# Patient Record
Sex: MALE | Race: WHITE
[De-identification: names, ages, dates, MRNs, and addresses within clinical notes are randomized per-mention and may not be internally consistent; named-entity substitution may affect disease eponyms.]

---

## 2021-07-01 ENCOUNTER — HOSPITAL ENCOUNTER (EMERGENCY)
Dept: HOSPITAL 11 - JP.ED | Age: 86
Discharge: NURSING, REHAB OR LONG-TERM CARE | End: 2021-07-01
Payer: COMMERCIAL

## 2021-07-01 DIAGNOSIS — E78.00: ICD-10-CM

## 2021-07-01 DIAGNOSIS — Z88.1: ICD-10-CM

## 2021-07-01 DIAGNOSIS — Z79.899: ICD-10-CM

## 2021-07-01 DIAGNOSIS — S72.091A: Primary | ICD-10-CM

## 2021-07-01 DIAGNOSIS — Z79.82: ICD-10-CM

## 2021-07-01 DIAGNOSIS — Z88.0: ICD-10-CM
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
## 2021-07-01 DIAGNOSIS — Z87.891: ICD-10-CM

## 2021-07-01 DIAGNOSIS — I12.9: ICD-10-CM

## 2021-07-01 DIAGNOSIS — N18.9: ICD-10-CM

## 2021-07-01 DIAGNOSIS — Z88.8: ICD-10-CM

## 2021-07-01 PROCEDURE — 85027 COMPLETE CBC AUTOMATED: CPT

## 2021-07-01 PROCEDURE — 99285 EMERGENCY DEPT VISIT HI MDM: CPT

## 2021-07-01 PROCEDURE — 86900 BLOOD TYPING SEROLOGIC ABO: CPT

## 2021-07-01 PROCEDURE — 36415 COLL VENOUS BLD VENIPUNCTURE: CPT

## 2021-07-01 PROCEDURE — 96375 TX/PRO/DX INJ NEW DRUG ADDON: CPT

## 2021-07-01 PROCEDURE — 70450 CT HEAD/BRAIN W/O DYE: CPT

## 2021-07-01 PROCEDURE — 96376 TX/PRO/DX INJ SAME DRUG ADON: CPT

## 2021-07-01 PROCEDURE — 73502 X-RAY EXAM HIP UNI 2-3 VIEWS: CPT

## 2021-07-01 PROCEDURE — 72125 CT NECK SPINE W/O DYE: CPT

## 2021-07-01 PROCEDURE — 86850 RBC ANTIBODY SCREEN: CPT

## 2021-07-01 PROCEDURE — 96374 THER/PROPH/DIAG INJ IV PUSH: CPT

## 2021-07-01 PROCEDURE — 80048 BASIC METABOLIC PNL TOTAL CA: CPT

## 2021-07-01 PROCEDURE — 86901 BLOOD TYPING SEROLOGIC RH(D): CPT

## 2021-07-01 NOTE — CT
Cervical Spine wo Cont

 

CLINICAL HISTORY: Fall

 

TECHNIQUE: Multiple CT sections were taken through the cervical spine in the

transaxial projection. Coronal and sagittal views were reconstructed. Images

were viewed at bone as well as soft tissue windows on a digital workstation.

Auto dosage reduction and iterative reconstruction techniques employed.

 

FINDINGS: There is mild anterior wedging of C4. Trabecular pattern appears

intact. This is likely of remote chronology. There is moderate diffuse

degenerative disc disease with spondylosis. There is minimal retrolisthesis 4

and C5  This is felt to be due to facet disease. 

Axial images show moderate uncovertebral joint spurring on the left at C3-4

causing moderate bony foraminal encroachment. There is bilateral bony foraminal

encroachment seen at C4-5. There is bilateral bony foraminal encroachment at

C5-6 and as well as broad-based disc osteophyte complex. This causes central

canal stenosis. There is bilateral bony foraminal encroachment at C6-7

 

IMPRESSION: Mild anterior compression deformity of C4 is likely of remote

chronology

 

Severe diffuse degenerative disc disease with spondylosis and uncovertebral

joint spurring. This causes multiple levels neural foraminal encroachment

described above. There is central canal stenosis at C5-6.

 

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 There is minimal

retrolisthesis of C4 and C5.

## 2021-07-01 NOTE — CT
Head wo Cont

 

CLINICAL HISTORY: Fall, head trauma 

 

COMPARISON: None

 

TECHNIQUE: Transverse scans were obtained from the base of the skull through the

vertex without IV contrast on a multislice, multidetector CT scanner. Auto

dosage reduction and iterative reconstruction techniques employed.

 

FINDINGS: No focal abnormal parenchymal density is identified.. There is no mass

effect, hemorrhage, or extraaxial collection. The basal cisterns and sulci over

the convexities are prominent. The ventricles are prominent.

 

IMPRESSION: Moderate age-related atrophy.

 

No acute intracranial process

## 2021-07-01 NOTE — CR
Hip Min 2V or 3V w Pelvis Rt

 

CLINICAL HISTORY: Hip pain, fall

 

FINDINGS: There is a fracture of the low right femoral neck. Pelvis appears

intact. There are degenerative changes in the lumbar spine.

 

IMPRESSION: Slightly displaced femoral fracture on the right

## 2021-07-01 NOTE — EDM.PDOC
ED HPI GENERAL MEDICAL PROBLEM





- General


Chief Complaint: Lower Extremity Injury/Pain


Stated Complaint: MEDICAL VIA [facility]


Time Seen by Provider: 07/01/21 14:00


Source of Information: Reports: Patient, Family


History Limitations: Reports: No Limitations





- History of Present Illness


INITIAL COMMENTS - FREE TEXT/NARRATIVE: 





This is an 85-year-old male presents with concerns of right hip pain.  He 

reports that he was on his deck's afternoon when he slipped on some wet wood and

landed on the right hip.  He also struck the back of his head.  He had no LOC.  

He is now concerned of severe pain in the right hip.  He has been unable to 

ambulate due to the pain.  He actually laid on the deck for about an hour before

he was found by his wife, after which EMS was called and now presents to the ED.

 He denies any blood thinners.  He does report history of CKD.


  ** Right Hip


Pain Score (Numeric/FACES): 8





- Related Data


                                    Allergies











Allergy/AdvReac Type Severity Reaction Status Date / Time


 


allopurinol Allergy  Sweating Verified 07/01/21 14:06


 


Penicillins Allergy  Hives Verified 07/01/21 14:06


 


Sulfa (Sulfonamide Allergy  Hives Verified 07/01/21 14:06





Antibiotics)     











Home Meds: 


                                    Home Meds





Aspirin [Halfprin] 81 mg PO DAILY 07/01/21 [History]


Febuxostat [Uloric] 40 mg PO DAILY 07/01/21 [History]


Simvastatin 40 mg PO DAILY 07/01/21 [History]











Past Medical History


HEENT History: Reports: Hard of Hearing


Cardiovascular History: Reports: High Cholesterol, Hypertension


Respiratory History: Reports: SOB


Genitourinary History: Reports: Chronic Renal Insuffiency


Musculoskeletal History: Reports: Gout


Neurological History: Reports: None


Psychiatric History: Reports: None


Endocrine/Metabolic History: Reports: None


Hematologic History: Reports: None


Immunologic History: Reports: None


Oncologic (Cancer) History: Reports: None


Dermatologic History: Reports: Urticaria





- Infectious Disease History


Infectious Disease History: Reports: Measles


Other Infectious Disease History: had covid vacc





- Past Surgical History


HEENT Surgical History: Reports: None


GI Surgical History: Reports: Hernia Repair/Other





Social & Family History





- Tobacco Use


Tobacco Use Status *Q: Former Tobacco User


Used Tobacco, but Quit: Yes


Month/Year Tobacco Last Used: 1990's





- Caffeine Use


Caffeine Use: Reports: Coffee


Caffeine Use Comment: 1-2 day





- Recreational Drug Use


Recreational Drug Use: No





Review of Systems





- Review of Systems


Review Of Systems: See Below


Constitutional: Reports: No Symptoms


Eyes: Reports: No Symptoms


Ears: Reports: No Symptoms


Nose: Reports: No Symptoms


Mouth/Throat: Reports: No Symptoms


Respiratory: Reports: No Symptoms


Cardiovascular: Reports: No Symptoms


GI/Abdominal: Reports: No Symptoms


Genitourinary: Reports: No Symptoms


Musculoskeletal: Reports: Joint Pain.  Denies: Neck Pain


Skin: Reports: No Symptoms


Neurological: Reports: No Symptoms.  Denies: Headache


Psychiatric: Reports: No Symptoms





ED EXAM, GENERAL





- Physical Exam


Exam: See Below


Exam Limited By: No Limitations


General Appearance: Alert, Mild Distress


Ears: Normal External Exam


Nose: Normal Inspection


Throat/Mouth: Normal Inspection


Head: Atraumatic, Normocephalic


Neck: Normal Inspection.  No: Tender Midline


Respiratory/Chest: No Respiratory Distress, Lungs Clear


Cardiovascular: Regular Rate, Rhythm


GI/Abdominal: Soft, Non-Tender


Back Exam: Normal Inspection


Extremities: Other (Right leg is externally rotated and foreshortened, 

tenderness with any palpation of the right hip.)


Neurological: Alert, Oriented


Psychiatric: Normal Affect, Normal Mood


Skin Exam: Warm, Dry





Course





- Vital Signs


Last Recorded V/S: 


                                Last Vital Signs











Temp      


 


Pulse  62   07/01/21 16:03


 


Resp  16   07/01/21 14:45


 


BP  135/67   07/01/21 17:08


 


Pulse Ox  90 L  07/01/21 16:03














- Orders/Labs/Meds


Orders: 


                               Active Orders 24 hr











 Category Date Time Status


 


 PATIENT RETYPE [BBK] Stat Lab  07/01/21 14:25 Results


 


 TYPE AND SCREEN [BBK] Stat Lab  07/01/21 14:25 Results











Labs: 


                                Laboratory Tests











  07/01/21 07/01/21 07/01/21 Range/Units





  14:25 14:25 14:25 


 


WBC  15.8 H    (4.5-11.0)  K/uL


 


RBC  4.83    (4.30-5.90)  M/uL


 


Hgb  15.5 H    (12.0-15.0)  g/dL


 


Hct  46.8    (40.0-54.0)  %


 


MCV  97    (80-98)  fL


 


MCH  32 H    (27-31)  pg


 


MCHC  33    (32-36)  %


 


Plt Count  226    (150-400)  K/uL


 


Sodium   143   (140-148)  mmol/L


 


Potassium   4.1   (3.6-5.2)  mmol/L


 


Chloride   106   (100-108)  mmol/L


 


Carbon Dioxide   23   (21-32)  mmol/L


 


Anion Gap   14.1 H   (5.0-14.0)  mmol/L


 


BUN   42 H   (7-18)  mg/dL


 


Creatinine   2.0 H   (0.8-1.3)  mg/dL


 


Est Cr Clr Drug Dosing   29.35   mL/min


 


Estimated GFR (MDRD)   32 L   (>60)  


 


Glucose   108 H   ()  mg/dL


 


Calcium   9.6   (8.5-10.1)  mg/dL


 


Blood Type    O POSITIVE  


 


Gel Antibody Screen    Negative  











Meds: 


Medications














Discontinued Medications














Generic Name Dose Route Start Last Admin





  Trade Name Freq  PRN Reason Stop Dose Admin


 


Fentanyl  100 mcg  07/01/21 14:14  07/01/21 14:18





  Fentanyl 100 Mcg/2 Ml Sdv  IVPUSH  07/01/21 14:15  100 mcg





  ONETIME ONE   Administration


 


Fentanyl  100 mcg  07/01/21 17:49 





  Fentanyl 100 Mcg/2 Ml Sdv  IVPUSH  07/01/21 17:50 





  ONETIME ONE  


 


Fentanyl  75 mcg  07/01/21 17:54  07/01/21 17:57





  Fentanyl 100 Mcg/2 Ml Sdv  IVPUSH  07/01/21 17:55  75 mcg





  ONETIME ONE   Administration


 


Hydromorphone HCl  1 mg  07/01/21 15:13  07/01/21 15:20





  Hydromorphone 1 Mg/Ml Syringe  IVPUSH  07/01/21 15:14  1 mg





  ONETIME ONE   Administration














- Re-Assessments/Exams


Free Text/Narrative Re-Assessment/Exam: 


This is an 85-year-old male who presents with right hip pain after mechanical 

fall.


On exam he is noted to have stable vitals, as noted findings were concerning for

 possible hip fracture.  No other traumatic injuries identified.


X-ray the right hip confirms a femoral neck fracture.


We did obtain a CT of the head & neck given his fall and head strike, 

unremarkable.


Screening labs generally unremarkable, he does have history of CKD.  He was 

administered IV fentanyl followed by a dose of Dilaudid for pain control.


Unfortunately we do not have any orthopedic surgery available at our facility.


He was therefore transferred to [facility] for operative management.


07/01/21 18:42








Departure





- Departure


Time of Disposition: 17:00


Disposition: DC/[facility]fer to Acute Hospital [facility]


Clinical Impression: 


 Fracture of neck of femur, hip








- Discharge Information


Referrals: 


PCP,None [Primary Care Provider] - 


Forms:  ED Department Discharge





Sepsis Event Note (ED)





- Evaluation


Sepsis Screening Result: No Definite Risk





- Focused Exam


Vital Signs: 


                                   Vital Signs











  Pulse Pulse Resp BP BP Pulse Ox


 


 07/01/21 17:08      135/67 


 


 07/01/21 16:03  62     122/66  90 L


 


 07/01/21 14:45   66  16  149/75 H   95


 


 07/01/21 14:10  65     145/80 H  93 L














- My Orders


Last 24 Hours: 


My Active Orders





07/01/21 14:25


PATIENT RETYPE [BBK] Stat 


TYPE AND SCREEN [BBK] Stat 














- Assessment/Plan


Last 24 Hours: 


My Active Orders





07/01/21 14:25


PATIENT RETYPE [BBK] Stat 


TYPE AND SCREEN [BBK] Stat